# Patient Record
Sex: FEMALE | Race: WHITE | URBAN - METROPOLITAN AREA
[De-identification: names, ages, dates, MRNs, and addresses within clinical notes are randomized per-mention and may not be internally consistent; named-entity substitution may affect disease eponyms.]

---

## 2019-09-11 NOTE — PROCEDURE NOTE: SURGICAL
<p>Prior to commencing surgery, patient identification, surgical procedure, site, and side were confirmed by Dr. Maximo Chau. Following topical proparacaine anesthesia, the patient was positioned at the YAG laser, a contact lens coupled to the cornea with methylcellulose and a peripheral iridotomy placed using 3.4 Mj x 50. without complication. Excess methylcellulose was washed from the eyes, one drop of Econopred Plus 1% instilled and the patient returned to the holding area having tolerated the procedure well and without complication. </p><p>MRN:[id number]</p>

## 2021-10-05 ENCOUNTER — COMPREHENSIVE EXAM (OUTPATIENT)
Dept: URBAN - METROPOLITAN AREA CLINIC 50 | Facility: CLINIC | Age: 66
End: 2021-10-05

## 2021-10-05 DIAGNOSIS — E11.9: ICD-10-CM

## 2021-10-05 DIAGNOSIS — H40.1131: ICD-10-CM

## 2021-10-05 DIAGNOSIS — H25.13: ICD-10-CM

## 2021-10-05 DIAGNOSIS — Z79.4: ICD-10-CM

## 2021-10-05 PROCEDURE — 92014 COMPRE OPH EXAM EST PT 1/>: CPT

## 2021-10-05 PROCEDURE — 76514 ECHO EXAM OF EYE THICKNESS: CPT

## 2021-10-05 ASSESSMENT — VISUAL ACUITY
OD_SC: 20/400
OS_SC: 20/60
OS_CC: 20/20-1
OD_GLARE: 20/30
OS_GLARE: 20/50
OU_CC: J1+
OU_SC: 20/50
OS_GLARE: 20/30-2
OD_GLARE: 20/50
OD_CC: 20/20
OU_CC: 20/20+2

## 2021-10-05 ASSESSMENT — TONOMETRY
OD_IOP_MMHG: 16
OD_IOP_MMHG: 17
OS_IOP_MMHG: 17
OS_IOP_MMHG: 16

## 2021-10-05 ASSESSMENT — PACHYMETRY
OS_CT_UM: 560
OD_CT_UM: 556

## 2021-10-15 NOTE — PATIENT DISCUSSION
OCT appears to show worsening lamellar hole assoc with ERM and would rec eval with TB for monitoring/sx intervention. Vision remains very good.

## 2021-10-15 NOTE — PATIENT DISCUSSION
Slight growth in cataract but disc at this time with retinal issue would wait for now and update rx. Call with worsening.

## 2021-11-12 ENCOUNTER — DIAGNOSTICS - HVF/OCT (OUTPATIENT)
Dept: URBAN - METROPOLITAN AREA CLINIC 50 | Facility: CLINIC | Age: 66
End: 2021-11-12

## 2021-11-12 DIAGNOSIS — H40.1131: ICD-10-CM

## 2021-11-12 PROCEDURE — 92133 CPTRZD OPH DX IMG PST SGM ON: CPT

## 2021-11-12 PROCEDURE — 92083 EXTENDED VISUAL FIELD XM: CPT

## 2022-02-24 ENCOUNTER — FOLLOW UP (OUTPATIENT)
Dept: URBAN - METROPOLITAN AREA CLINIC 50 | Facility: CLINIC | Age: 67
End: 2022-02-24

## 2022-02-24 DIAGNOSIS — H40.1131: ICD-10-CM

## 2022-02-24 PROCEDURE — 92012 INTRM OPH EXAM EST PATIENT: CPT

## 2022-02-24 ASSESSMENT — TONOMETRY
OS_IOP_MMHG: 17
OD_IOP_MMHG: 17
OS_IOP_MMHG: 18
OD_IOP_MMHG: 18

## 2022-02-24 ASSESSMENT — VISUAL ACUITY
OD_CC: 20/25
OU_CC: 20/25+2
OS_CC: 20/25

## 2022-07-18 ENCOUNTER — FOLLOW UP (OUTPATIENT)
Dept: URBAN - METROPOLITAN AREA CLINIC 50 | Facility: CLINIC | Age: 67
End: 2022-07-18

## 2022-07-18 DIAGNOSIS — H40.1131: ICD-10-CM

## 2022-07-18 PROCEDURE — 92012 INTRM OPH EXAM EST PATIENT: CPT

## 2022-07-18 ASSESSMENT — VISUAL ACUITY
OU_CC: 20/20
OS_CC: 20/30
OU_CC: J1+
OD_CC: 20/20

## 2022-07-18 ASSESSMENT — TONOMETRY
OD_IOP_MMHG: 17
OS_IOP_MMHG: 20

## 2022-07-18 NOTE — PATIENT DISCUSSION
Discussed she had a little bit of a jump in her left eye pressure. She has had a steroid cream within the last few weeks. She said it also caused her sugars to increase. She stopped the cream. Will have patient continue Latanoprost in both eyes at bed time.

## 2023-04-18 ENCOUNTER — COMPREHENSIVE EXAM (OUTPATIENT)
Dept: URBAN - METROPOLITAN AREA CLINIC 52 | Facility: CLINIC | Age: 68
End: 2023-04-18

## 2023-04-18 DIAGNOSIS — H40.1131: ICD-10-CM

## 2023-04-18 PROCEDURE — 92083 EXTENDED VISUAL FIELD XM: CPT

## 2023-04-18 PROCEDURE — 92014 COMPRE OPH EXAM EST PT 1/>: CPT

## 2023-04-18 PROCEDURE — 92133 CPTRZD OPH DX IMG PST SGM ON: CPT

## 2023-04-18 ASSESSMENT — VISUAL ACUITY
OS_CC: 20/25
OU_CC: J1+
OD_CC: 20/20
OS_GLARE: 20/30
OD_GLARE: 20/20
OD_GLARE: 20/20
OS_GLARE: 20/40
OU_CC: 20/20

## 2023-04-18 ASSESSMENT — TONOMETRY
OD_IOP_MMHG: 23
OS_IOP_MMHG: 21
OS_IOP_MMHG: 20
OS_IOP_MMHG: 18
OD_IOP_MMHG: 22
OD_IOP_MMHG: 19
OS_IOP_MMHG: 17
OD_IOP_MMHG: 18

## 2023-11-07 NOTE — PATIENT DISCUSSION
Recommended artificial tears to use: 1 drop 4x a day in both eyes. Spoke with pt. Pt confirmed he did complete his FCE and is looking for Dr. Johnson to fill out long term disability paperwork. Let him know our office providers do not fill these paperwork and the process is to get an independent medical examiner to interpret and fill out paperwork. He verbalize understanding and would like a list of independent medical examiner. Let him know will mail out to him. Verified mailing address.     List of independent medical examiner handout out of date as unable to find providers in internet.     Spoke with pt. Let him know above and let him know he can search online for independent medical examiner within [place] and there should be a list of providers. He verbalize understanding. No further question.

## 2023-12-05 ENCOUNTER — FOLLOW UP (OUTPATIENT)
Dept: URBAN - METROPOLITAN AREA CLINIC 52 | Facility: CLINIC | Age: 68
End: 2023-12-05

## 2023-12-05 DIAGNOSIS — H40.1131: ICD-10-CM

## 2023-12-05 PROCEDURE — 92012 INTRM OPH EXAM EST PATIENT: CPT

## 2023-12-05 ASSESSMENT — TONOMETRY
OS_IOP_MMHG: 18
OS_IOP_MMHG: 17
OD_IOP_MMHG: 17
OD_IOP_MMHG: 18

## 2023-12-05 ASSESSMENT — VISUAL ACUITY
OS_PH: 20/25
OD_CC: 20/25
OS_CC: 20/30

## 2024-07-08 ENCOUNTER — DIAGNOSTICS ONLY (OUTPATIENT)
Dept: URBAN - METROPOLITAN AREA CLINIC 52 | Facility: CLINIC | Age: 69
End: 2024-07-08

## 2024-07-08 DIAGNOSIS — H40.1131: ICD-10-CM

## 2024-07-08 PROCEDURE — 92083 EXTENDED VISUAL FIELD XM: CPT

## 2024-07-08 PROCEDURE — 92133 CPTRZD OPH DX IMG PST SGM ON: CPT

## 2024-07-12 ENCOUNTER — COMPREHENSIVE EXAM (OUTPATIENT)
Dept: URBAN - METROPOLITAN AREA CLINIC 24 | Facility: CLINIC | Age: 69
End: 2024-07-12

## 2024-07-12 DIAGNOSIS — E11.9: ICD-10-CM

## 2024-07-12 DIAGNOSIS — H40.1131: ICD-10-CM

## 2024-07-12 PROCEDURE — 99214 OFFICE O/P EST MOD 30 MIN: CPT

## 2024-07-12 PROCEDURE — 92015 DETERMINE REFRACTIVE STATE: CPT

## 2024-07-12 ASSESSMENT — VISUAL ACUITY
OS_CC: 20/30-1
OU_CC: 20/20
OD_GLARE: 20/30-1
OU_CC: J1+@16
OD_GLARE: 20/25-1
OS_GLARE: 20/40-2
OS_GLARE: 20/30-2
OD_CC: 20/20

## 2024-07-12 ASSESSMENT — TONOMETRY
OS_IOP_MMHG: 21
OS_IOP_MMHG: 20
OD_IOP_MMHG: 20
OD_IOP_MMHG: 21

## 2024-07-12 ASSESSMENT — KERATOMETRY
OS_K2POWER_DIOPTERS: 43.75
OS_AXISANGLE_DEGREES: 34
OD_K2POWER_DIOPTERS: 44.00
OS_K1POWER_DIOPTERS: 43.50
OD_AXISANGLE2_DEGREES: 90
OD_AXISANGLE_DEGREES: 0
OD_K1POWER_DIOPTERS: 44.00
OS_AXISANGLE2_DEGREES: 124

## 2024-09-26 NOTE — PATIENT DISCUSSION
Stressed the importance of controlling vascular risk factors. Abdominal pain and nausea since yesterday.

## 2025-01-20 ENCOUNTER — FOLLOW UP (OUTPATIENT)
Age: 70
End: 2025-01-20

## 2025-01-20 DIAGNOSIS — H25.11: ICD-10-CM

## 2025-01-20 DIAGNOSIS — H40.1131: ICD-10-CM

## 2025-01-20 DIAGNOSIS — H25.812: ICD-10-CM

## 2025-01-20 PROCEDURE — 99213 OFFICE O/P EST LOW 20 MIN: CPT
